# Patient Record
Sex: MALE | Race: WHITE | NOT HISPANIC OR LATINO | URBAN - METROPOLITAN AREA
[De-identification: names, ages, dates, MRNs, and addresses within clinical notes are randomized per-mention and may not be internally consistent; named-entity substitution may affect disease eponyms.]

---

## 2023-11-16 ENCOUNTER — APPOINTMENT (OUTPATIENT)
Dept: URBAN - METROPOLITAN AREA CLINIC 306 | Age: 28
Setting detail: DERMATOLOGY
End: 2023-11-16

## 2023-11-16 ENCOUNTER — RX ONLY (RX ONLY)
Age: 28
End: 2023-11-16

## 2023-11-16 DIAGNOSIS — L40.0 PSORIASIS VULGARIS: ICD-10-CM

## 2023-11-16 DIAGNOSIS — D18.0 HEMANGIOMA: ICD-10-CM

## 2023-11-16 PROBLEM — D18.01 HEMANGIOMA OF SKIN AND SUBCUTANEOUS TISSUE: Status: ACTIVE | Noted: 2023-11-16

## 2023-11-16 PROBLEM — L30.9 DERMATITIS, UNSPECIFIED: Status: ACTIVE | Noted: 2023-11-16

## 2023-11-16 PROCEDURE — 99202 OFFICE O/P NEW SF 15 MIN: CPT

## 2023-11-16 PROCEDURE — OTHER COUNSELING: OTHER

## 2023-11-16 RX ORDER — TACROLIMUS 1 MG/G
OINTMENT TOPICAL
Qty: 30 | Refills: 3 | Status: ERX | COMMUNITY
Start: 2023-11-16

## 2023-11-16 ASSESSMENT — LOCATION SIMPLE DESCRIPTION DERM
LOCATION SIMPLE: LEFT PENIS
LOCATION SIMPLE: RIGHT UPPER ARM

## 2023-11-16 ASSESSMENT — LOCATION DETAILED DESCRIPTION DERM
LOCATION DETAILED: RIGHT DISTAL MEDIAL POSTERIOR UPPER ARM
LOCATION DETAILED: LEFT LATERAL DISTAL DORSAL PENILE SHAFT

## 2023-11-16 ASSESSMENT — LOCATION ZONE DERM
LOCATION ZONE: PENIS
LOCATION ZONE: ARM

## 2023-11-16 NOTE — PROCEDURE: COUNSELING
Detail Level: Zone
Patient Specific Counseling (Will Not Stick From Patient To Patient): Dermatitis is most c/w psoriasis or a localized contact dermatitis.   Advised the pt we will tx and if recurs folllowing tx, psoriasis is more likely.  Also pt reports he has a similar dermatitis near the perianal area, which could be c/w psoriasis.  However, pt declined exam of that area.
Detail Level: Detailed

## 2023-11-16 NOTE — HPI: BODY LOCATION - GENITALIA
How Severe Is Your Condition?: mild
Additional History: Spot on right bicep and shoulder\\nNo treatment currently